# Patient Record
Sex: FEMALE | Race: WHITE | NOT HISPANIC OR LATINO | Employment: OTHER | URBAN - METROPOLITAN AREA
[De-identification: names, ages, dates, MRNs, and addresses within clinical notes are randomized per-mention and may not be internally consistent; named-entity substitution may affect disease eponyms.]

---

## 2023-07-08 LAB
HSV 1 PCR QUAL, SKIN/MUCOSA: DETECTED
HSV 2 PCR QUAL, SKIN/MUCOSA: NOT DETECTED

## 2024-03-18 ENCOUNTER — OFFICE VISIT (OUTPATIENT)
Dept: OBSTETRICS AND GYNECOLOGY | Facility: CLINIC | Age: 44
End: 2024-03-18
Payer: COMMERCIAL

## 2024-03-18 VITALS
HEIGHT: 65 IN | WEIGHT: 116 LBS | BODY MASS INDEX: 19.33 KG/M2 | SYSTOLIC BLOOD PRESSURE: 116 MMHG | DIASTOLIC BLOOD PRESSURE: 74 MMHG

## 2024-03-18 DIAGNOSIS — Z12.4 CERVICAL CANCER SCREENING: ICD-10-CM

## 2024-03-18 DIAGNOSIS — Z01.419 VISIT FOR PELVIC EXAM: Primary | ICD-10-CM

## 2024-03-18 DIAGNOSIS — Z12.31 VISIT FOR SCREENING MAMMOGRAM: ICD-10-CM

## 2024-03-18 PROCEDURE — 1036F TOBACCO NON-USER: CPT | Performed by: OBSTETRICS & GYNECOLOGY

## 2024-03-18 PROCEDURE — 99396 PREV VISIT EST AGE 40-64: CPT | Performed by: OBSTETRICS & GYNECOLOGY

## 2024-03-18 RX ORDER — VALACYCLOVIR HYDROCHLORIDE 1 G/1
1000 TABLET, FILM COATED ORAL EVERY 12 HOURS
COMMUNITY
Start: 2023-07-07

## 2024-03-18 RX ORDER — LEVONORGESTREL 52 MG/1
INTRAUTERINE DEVICE INTRAUTERINE
COMMUNITY

## 2024-03-18 ASSESSMENT — ENCOUNTER SYMPTOMS
CONSTITUTIONAL NEGATIVE: 1
MUSCULOSKELETAL NEGATIVE: 1
GASTROINTESTINAL NEGATIVE: 1
RESPIRATORY NEGATIVE: 1
NEUROLOGICAL NEGATIVE: 1
PSYCHIATRIC NEGATIVE: 1
CARDIOVASCULAR NEGATIVE: 1

## 2024-03-18 NOTE — PATIENT INSTRUCTIONS
Routine Gynecologic Visit:  You were seen today for a routine gyn visit with normal findings on exam  Your pap smear had normal cells and was negative for HPV in 2022, so you were not due for a pap smear today. You should still continue to get annual breast and pelvic exams in the office.  An order was placed in the system for mammogram. Please get done at your earliest convenience  Consider Relizen from [email or web address] for your hot flashes  If you are having any gynecologic issues in the next year you should call the office. [phone] ([place]) or [phone] ([place])

## 2024-03-18 NOTE — PROGRESS NOTES
"Subjective   Idania Del Cid is a 44 y.o. female who is here for a routine exam. Patient is primarily amenorrheic with occasional spotting. Cyclic symptoms include bloating. She notes worsening hot flashes/night sweats. No intermenstrual bleeding, spotting, or discharge. Denies dyspareunia.    Current contraception: IUD  History of abnormal Pap smear: yes - remote  Family history of uterine or ovarian cancer: no  Regular self breast exam: yes  History of abnormal mammogram: no  Family history of breast cancer: no  History of abnormal lipids: no  Menstrual History:  OB History          4    Para   4    Term   4            AB        Living   4         SAB        IAB        Ectopic        Multiple        Live Births   4                No LMP recorded. Patient has had an implant.         Review of Systems   Constitutional: Negative.    Respiratory: Negative.     Cardiovascular: Negative.    Gastrointestinal: Negative.    Genitourinary: Negative.    Musculoskeletal: Negative.    Neurological: Negative.    Psychiatric/Behavioral: Negative.         Objective   /74   Ht 1.651 m (5' 5\")   Wt 52.6 kg (116 lb)   BMI 19.30 kg/m²     General:   alert, appears stated age, and cooperative   Heart: regular rate and rhythm, S1, S2 normal, no murmur, click, rub or gallop   Lungs: clear to auscultation bilaterally   Abdomen: soft, non-tender, without masses or organomegaly   Pelvic: Vulva normal in appearance without discoloration, rashes, lesions. Vagina is negative for blood, discharge, lesions. Uterus is small, mobile, non tender. There is no cervical motion tenderness, adnexal masses/tenderness. IUD strings are visualized.     Breast: No masses, skin changes, discoloration, tenderness, or nipple drainage bilaterally     Neck: Normal ROM. Thyroid normal size. No masses/tenderness     Lymph: No LAD   Psych: Normal mood/affect     Assessment/Plan   Problem List Items Addressed This Visit    None  Visit " Diagnoses       Visit for pelvic exam    -  Primary    Pelvic and breast exam normal  Precautions given  RTO 1 year RA    Cervical cancer screening        NIL/neg 2022    Visit for screening mammogram        Order given 3/18/24    Relevant Orders    BI mammo bilateral screening tomosynthesis          Mia Calderón, DO

## 2024-03-28 ENCOUNTER — APPOINTMENT (OUTPATIENT)
Dept: RADIOLOGY | Facility: HOSPITAL | Age: 44
End: 2024-03-28
Payer: COMMERCIAL

## 2024-03-28 ENCOUNTER — HOSPITAL ENCOUNTER (EMERGENCY)
Facility: HOSPITAL | Age: 44
Discharge: HOME | End: 2024-03-28
Payer: COMMERCIAL

## 2024-03-28 VITALS
DIASTOLIC BLOOD PRESSURE: 94 MMHG | SYSTOLIC BLOOD PRESSURE: 129 MMHG | WEIGHT: 120 LBS | HEART RATE: 74 BPM | HEIGHT: 66 IN | TEMPERATURE: 97.7 F | OXYGEN SATURATION: 100 % | BODY MASS INDEX: 19.29 KG/M2 | RESPIRATION RATE: 18 BRPM

## 2024-03-28 DIAGNOSIS — R10.9 FLANK PAIN: Primary | ICD-10-CM

## 2024-03-28 LAB
ALBUMIN SERPL BCP-MCNC: 4.8 G/DL (ref 3.4–5)
ALP SERPL-CCNC: 53 U/L (ref 33–110)
ALT SERPL W P-5'-P-CCNC: 16 U/L (ref 7–45)
ANION GAP SERPL CALC-SCNC: 12 MMOL/L (ref 10–20)
APPEARANCE UR: CLEAR
AST SERPL W P-5'-P-CCNC: 19 U/L (ref 9–39)
B-HCG SERPL-ACNC: <2 MIU/ML
BASOPHILS # BLD AUTO: 0.03 X10*3/UL (ref 0–0.1)
BASOPHILS NFR BLD AUTO: 0.5 %
BILIRUB SERPL-MCNC: 0.5 MG/DL (ref 0–1.2)
BILIRUB UR STRIP.AUTO-MCNC: NEGATIVE MG/DL
BUN SERPL-MCNC: 11 MG/DL (ref 6–23)
CALCIUM SERPL-MCNC: 9.8 MG/DL (ref 8.6–10.3)
CHLORIDE SERPL-SCNC: 100 MMOL/L (ref 98–107)
CO2 SERPL-SCNC: 29 MMOL/L (ref 21–32)
COLOR UR: COLORLESS
CREAT SERPL-MCNC: 0.79 MG/DL (ref 0.5–1.05)
EGFRCR SERPLBLD CKD-EPI 2021: >90 ML/MIN/1.73M*2
EOSINOPHIL # BLD AUTO: 0.16 X10*3/UL (ref 0–0.7)
EOSINOPHIL NFR BLD AUTO: 2.7 %
ERYTHROCYTE [DISTWIDTH] IN BLOOD BY AUTOMATED COUNT: 12 % (ref 11.5–14.5)
GLUCOSE SERPL-MCNC: 80 MG/DL (ref 74–99)
GLUCOSE UR STRIP.AUTO-MCNC: NEGATIVE MG/DL
HCT VFR BLD AUTO: 45.4 % (ref 36–46)
HGB BLD-MCNC: 15.2 G/DL (ref 12–16)
IMM GRANULOCYTES # BLD AUTO: 0.01 X10*3/UL (ref 0–0.7)
IMM GRANULOCYTES NFR BLD AUTO: 0.2 % (ref 0–0.9)
KETONES UR STRIP.AUTO-MCNC: NEGATIVE MG/DL
LEUKOCYTE ESTERASE UR QL STRIP.AUTO: NEGATIVE
LYMPHOCYTES # BLD AUTO: 1.76 X10*3/UL (ref 1.2–4.8)
LYMPHOCYTES NFR BLD AUTO: 30.2 %
MCH RBC QN AUTO: 30.8 PG (ref 26–34)
MCHC RBC AUTO-ENTMCNC: 33.5 G/DL (ref 32–36)
MCV RBC AUTO: 92 FL (ref 80–100)
MONOCYTES # BLD AUTO: 0.43 X10*3/UL (ref 0.1–1)
MONOCYTES NFR BLD AUTO: 7.4 %
NEUTROPHILS # BLD AUTO: 3.43 X10*3/UL (ref 1.2–7.7)
NEUTROPHILS NFR BLD AUTO: 59 %
NITRITE UR QL STRIP.AUTO: NEGATIVE
NRBC BLD-RTO: 0 /100 WBCS (ref 0–0)
PH UR STRIP.AUTO: 7 [PH]
PLATELET # BLD AUTO: 226 X10*3/UL (ref 150–450)
POTASSIUM SERPL-SCNC: 3.8 MMOL/L (ref 3.5–5.3)
PROT SERPL-MCNC: 8 G/DL (ref 6.4–8.2)
PROT UR STRIP.AUTO-MCNC: NEGATIVE MG/DL
RBC # BLD AUTO: 4.93 X10*6/UL (ref 4–5.2)
RBC # UR STRIP.AUTO: NEGATIVE /UL
SODIUM SERPL-SCNC: 137 MMOL/L (ref 136–145)
SP GR UR STRIP.AUTO: 1
UROBILINOGEN UR STRIP.AUTO-MCNC: <2 MG/DL
WBC # BLD AUTO: 5.8 X10*3/UL (ref 4.4–11.3)

## 2024-03-28 PROCEDURE — 96374 THER/PROPH/DIAG INJ IV PUSH: CPT

## 2024-03-28 PROCEDURE — 74176 CT ABD & PELVIS W/O CONTRAST: CPT

## 2024-03-28 PROCEDURE — 84702 CHORIONIC GONADOTROPIN TEST: CPT | Performed by: PHYSICIAN ASSISTANT

## 2024-03-28 PROCEDURE — 36415 COLL VENOUS BLD VENIPUNCTURE: CPT | Performed by: PHYSICIAN ASSISTANT

## 2024-03-28 PROCEDURE — 2500000004 HC RX 250 GENERAL PHARMACY W/ HCPCS (ALT 636 FOR OP/ED): Performed by: PHYSICIAN ASSISTANT

## 2024-03-28 PROCEDURE — 85025 COMPLETE CBC W/AUTO DIFF WBC: CPT | Performed by: PHYSICIAN ASSISTANT

## 2024-03-28 PROCEDURE — 99284 EMERGENCY DEPT VISIT MOD MDM: CPT | Mod: 25

## 2024-03-28 PROCEDURE — 96375 TX/PRO/DX INJ NEW DRUG ADDON: CPT

## 2024-03-28 PROCEDURE — 81003 URINALYSIS AUTO W/O SCOPE: CPT | Performed by: PHYSICIAN ASSISTANT

## 2024-03-28 PROCEDURE — 80053 COMPREHEN METABOLIC PANEL: CPT | Performed by: PHYSICIAN ASSISTANT

## 2024-03-28 PROCEDURE — 96361 HYDRATE IV INFUSION ADD-ON: CPT

## 2024-03-28 PROCEDURE — 74176 CT ABD & PELVIS W/O CONTRAST: CPT | Performed by: STUDENT IN AN ORGANIZED HEALTH CARE EDUCATION/TRAINING PROGRAM

## 2024-03-28 RX ORDER — ONDANSETRON HYDROCHLORIDE 2 MG/ML
4 INJECTION, SOLUTION INTRAVENOUS ONCE
Status: COMPLETED | OUTPATIENT
Start: 2024-03-28 | End: 2024-03-28

## 2024-03-28 RX ORDER — KETOROLAC TROMETHAMINE 15 MG/ML
15 INJECTION, SOLUTION INTRAMUSCULAR; INTRAVENOUS ONCE
Status: COMPLETED | OUTPATIENT
Start: 2024-03-28 | End: 2024-03-28

## 2024-03-28 RX ADMIN — SODIUM CHLORIDE 1000 ML: 9 INJECTION, SOLUTION INTRAVENOUS at 17:42

## 2024-03-28 RX ADMIN — KETOROLAC TROMETHAMINE 15 MG: 15 INJECTION, SOLUTION INTRAMUSCULAR; INTRAVENOUS at 17:42

## 2024-03-28 RX ADMIN — ONDANSETRON 4 MG: 2 INJECTION INTRAMUSCULAR; INTRAVENOUS at 17:42

## 2024-03-28 ASSESSMENT — PAIN DESCRIPTION - PAIN TYPE: TYPE: ACUTE PAIN

## 2024-03-28 ASSESSMENT — COLUMBIA-SUICIDE SEVERITY RATING SCALE - C-SSRS
2. HAVE YOU ACTUALLY HAD ANY THOUGHTS OF KILLING YOURSELF?: NO
6. HAVE YOU EVER DONE ANYTHING, STARTED TO DO ANYTHING, OR PREPARED TO DO ANYTHING TO END YOUR LIFE?: NO
1. IN THE PAST MONTH, HAVE YOU WISHED YOU WERE DEAD OR WISHED YOU COULD GO TO SLEEP AND NOT WAKE UP?: NO

## 2024-03-28 ASSESSMENT — PAIN DESCRIPTION - LOCATION: LOCATION: ABDOMEN

## 2024-03-28 ASSESSMENT — PAIN - FUNCTIONAL ASSESSMENT
PAIN_FUNCTIONAL_ASSESSMENT: 0-10
PAIN_FUNCTIONAL_ASSESSMENT: 0-10

## 2024-03-28 ASSESSMENT — LIFESTYLE VARIABLES
HAVE PEOPLE ANNOYED YOU BY CRITICIZING YOUR DRINKING: NO
TOTAL SCORE: 0
EVER HAD A DRINK FIRST THING IN THE MORNING TO STEADY YOUR NERVES TO GET RID OF A HANGOVER: NO
EVER FELT BAD OR GUILTY ABOUT YOUR DRINKING: NO
HAVE YOU EVER FELT YOU SHOULD CUT DOWN ON YOUR DRINKING: NO

## 2024-03-28 ASSESSMENT — PAIN SCALES - GENERAL
PAINLEVEL_OUTOF10: 8
PAINLEVEL_OUTOF10: 7

## 2024-03-28 ASSESSMENT — PAIN DESCRIPTION - ORIENTATION: ORIENTATION: RIGHT

## 2024-03-28 ASSESSMENT — PAIN DESCRIPTION - DESCRIPTORS: DESCRIPTORS: ACHING;SHARP;STABBING

## 2024-03-28 NOTE — ED TRIAGE NOTES
Patient c/o right flank and back pain that started a week ago, patient has a hx of kidney stones and had a recent CT that showed 5 kidney stones in the right ureter. Patient was unable to get into her urologist today.

## 2024-03-28 NOTE — ED PROVIDER NOTES
HPI   Chief Complaint   Patient presents with    Flank Pain       This is a 44-year-old female with PMH nephrolithiasis with associated lithotripsy and stents distantly presenting for evaluation of atraumatic left flank pain rating to the groin since Monday.  Initially she thought it was because her kids slept in the bed with her but now she is thinking it is a stone.  Her urologist office advised her to come in for evaluation. Denies fevers, chills, chest pain, shortness of breath, nausea, vomiting, diarrhea, constipation, hematochezia, melena, vaginal symptoms, scrotal pain.      History provided by:  Patient   used: No                        Ton Coma Scale Score: 15                     Patient History   Past Medical History:   Diagnosis Date    Acute vaginitis 10/10/2014    Bacterial vaginosis    Breast implant status     History of breast augmentation    Encounter for full-term uncomplicated delivery     Vaginal delivery    Encounter for pregnancy test, result unknown 09/09/2016    Unconfirmed pregnancy    Encounter for pregnancy test, result unknown 09/09/2016    Visit for confirmation of pregnancy test result with physical exam    Encounter for supervision of normal pregnancy, unspecified, first trimester 09/09/2016    Encounter for pregnancy related examination in first trimester    Irregular menstruation, unspecified 09/09/2016    Missed period    Papillomavirus as the cause of diseases classified elsewhere 09/30/2014    HPV in female    Personal history of diseases of the skin and subcutaneous tissue     History of alopecia    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  Social History     Tobacco Use    Smoking status: Never    Smokeless tobacco: Never   Vaping Use    Vaping Use: Never used   Substance Use Topics    Alcohol use: Yes    Drug use: Never       Physical Exam   ED Triage Vitals [03/28/24 1709]   Temperature Heart Rate Respirations BP   36.5 °C (97.7 °F) 75 18 141/89      Pulse Ox Temp Source Heart Rate Source Patient Position   98 % Temporal Monitor --      BP Location FiO2 (%)     -- --       Physical Exam    General: Vitals noted. Afebrile. No apparent distress  EENT: Sclerae anicteric  Cardiac: Regular rate and rhythm. No murmur  Pulmonary: Lungs clear bilaterally with good aeration  Abdomen: Soft. Nontender. No rebound. No guarding  : Left CVA tenderness. exam deferred  Extremities: CHONG normally  Skin: No rash on abdomen  Neuro: Alert and oriented    ED Course & MDM   Diagnoses as of 03/28/24 1911   Flank pain       Medical Decision Making  DDx: Pyelonephritis, ureterolithiasis, hydronephrosis, UTI    Patient is stable hemodynamically.  Appears somewhat uncomfortable.  Physical exam as above.  Laboratory evaluation reassuring.  No leukocytosis, stable H&H.  CT abdomen pelvis without contrast showed obstructing subcentimeter bilateral renal calculi no evidence of ureteral calculus but mild bilateral Pallavi atelectasis possibly sequela of remote obstructive uropathy with incomplete resolution of the hydronephrosis no evidence of discernible inflammatory changes about the kidneys as read by the radiologist.  Patient was given IV normal saline IV Toradol IV Zofran.  At this time patient is stable to be discharged to follow-up.  Instructed to return to the nearest ED if any concerns or new or worsening symptoms. Patient verbalized understanding and agreement with plan. Discharged in stable condition.      Disclaimer: This note was dictated using speech recognition software. An attempt at proofreading was made to  minimize errors. Minor errors in transcription may be present. Please call if questions.    Amount and/or Complexity of Data Reviewed  Labs: ordered.  Radiology: ordered.        Procedure  Procedures     Jose Carlisle PA-C  03/28/24 1913       Jose Carlisle PA-C  03/28/24 1914

## 2024-03-29 LAB — HOLD SPECIMEN: NORMAL

## 2024-04-26 ENCOUNTER — OFFICE VISIT (OUTPATIENT)
Dept: UROLOGY | Facility: HOSPITAL | Age: 44
End: 2024-04-26
Payer: COMMERCIAL

## 2024-04-26 DIAGNOSIS — N39.3 SUI (STRESS URINARY INCONTINENCE, FEMALE): ICD-10-CM

## 2024-04-26 DIAGNOSIS — R10.9 BILATERAL FLANK PAIN: Primary | ICD-10-CM

## 2024-04-26 DIAGNOSIS — N20.0 NEPHROLITHIASIS: ICD-10-CM

## 2024-04-26 PROCEDURE — 99214 OFFICE O/P EST MOD 30 MIN: CPT | Performed by: UROLOGY

## 2024-04-26 PROCEDURE — 1036F TOBACCO NON-USER: CPT | Performed by: UROLOGY

## 2024-04-26 PROCEDURE — 99204 OFFICE O/P NEW MOD 45 MIN: CPT | Performed by: UROLOGY

## 2024-04-26 NOTE — PROGRESS NOTES
"HPI    History of stones  History of flank pain    Seen in ED 3/28/24 with L flank pain. CT with just nonobstructing stones bilaterally, 3 stones up to 6mm on L, 1 stone approx 3mm on R.     04/26/24 -seen today in consult.  History of stones, last stone episode and surgery was about 10 years ago.  Her pain is now gone.  We compared her prior CT scan from 2023 with her current scan, essentially looks exactly the same.  There is no suggestion of significant metabolic activity.    LUTS  Stress urinary incontinence, only mildly bothersome.    Stones   Prior stones, prior interventions: Ureteroscopy in 2009, approximately 2015      No results found for: \"PSA\"      Current Medications:  Current Outpatient Medications   Medication Sig Dispense Refill    levonorgestrel (Mirena) 21 mcg/24 hours (8 yrs) 52 mg IUD by intrauterine route.      valACYclovir (Valtrex) 1 gram tablet Take 1 tablet (1,000 mg) by mouth every 12 hours.       No current facility-administered medications for this visit.        Active Problems:  Idania Del Cid is a 44 y.o. female with the following Problems and Medications.  There is no problem list on file for this patient.    Current Outpatient Medications   Medication Sig Dispense Refill    levonorgestrel (Mirena) 21 mcg/24 hours (8 yrs) 52 mg IUD by intrauterine route.      valACYclovir (Valtrex) 1 gram tablet Take 1 tablet (1,000 mg) by mouth every 12 hours.       No current facility-administered medications for this visit.       PMH:  Past Medical History:   Diagnosis Date    Acute vaginitis 10/10/2014    Bacterial vaginosis    Breast implant status     History of breast augmentation    Encounter for full-term uncomplicated delivery ([facility])     Vaginal delivery    Encounter for pregnancy test, result unknown 09/09/2016    Unconfirmed pregnancy    Encounter for pregnancy test, result unknown 09/09/2016    Visit for confirmation of pregnancy test result with physical exam    Encounter for " supervision of normal pregnancy, unspecified, first trimester ([facility]) 09/09/2016    Encounter for pregnancy related examination in first trimester    Irregular menstruation, unspecified 09/09/2016    Missed period    Papillomavirus as the cause of diseases classified elsewhere 09/30/2014    HPV in female    Personal history of diseases of the skin and subcutaneous tissue     History of alopecia    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    SHx:  Social History     Tobacco Use    Smoking status: Never    Smokeless tobacco: Never   Vaping Use    Vaping status: Never Used   Substance Use Topics    Alcohol use: Yes    Drug use: Never       Allergies:  No Known Allergies    Physical Exam:  No CVA tenderness bilaterally    Assessment/Plan  44-year-old female with history of stones, recent episode of flank pain.  CT at that time showed nonobstructive stones.  We compared her CT from last month with CT from 1 year prior, essentially the same suggesting no metabolic activity.  Not recommend metabolic management at this time given lack of evidence of ongoing metabolic activity.  Discussed the surgical management of her stones.  Discussed ESWL versus ureteroscopy.  Discussed indications for stone removal.    For now she would like to think about it.  If she would like to proceed with surgery, she will let me know.  If she would like to ESWL, would obtain a KUB to see if the stones are radiopaque and then determine a plan of care from there.  If she would like ureteroscopy, would not need any further imaging.    If she is doing well and  elects no interval intervention, we will see her back in 1 year with a KUB and ultrasound prior to keep an eye on her stones.  If at some point they are growing, would revisit metabolic testing.    We discussed her stress incontinence.  Discussed observation versus pelvic floor physical therapy versus urethral bulking agents versus slings.  She will think about it and let me know if she would like to discuss further.            Scribe Attestation  By signing my name below, I, Serenity Melendez, attest that this documentation  has been prepared under the direction and in the presence of Clinton Perry MD.

## 2024-05-09 ENCOUNTER — APPOINTMENT (OUTPATIENT)
Dept: UROLOGY | Facility: HOSPITAL | Age: 44
End: 2024-05-09
Payer: COMMERCIAL

## 2024-10-17 ASSESSMENT — ENCOUNTER SYMPTOMS
SORE THROAT: 0
ANOREXIA: 0
HEMATURIA: 0
CHILLS: 0
FLANK PAIN: 0
ABDOMINAL PAIN: 0
FREQUENCY: 1
HEADACHES: 0
BACK PAIN: 0
DYSURIA: 0
CONSTIPATION: 1
NAUSEA: 0
FEVER: 0
DIARRHEA: 0
VOMITING: 0

## 2024-10-18 ENCOUNTER — OFFICE VISIT (OUTPATIENT)
Dept: OBSTETRICS AND GYNECOLOGY | Facility: CLINIC | Age: 44
End: 2024-10-18
Payer: COMMERCIAL

## 2024-10-18 VITALS
WEIGHT: 123 LBS | SYSTOLIC BLOOD PRESSURE: 108 MMHG | DIASTOLIC BLOOD PRESSURE: 72 MMHG | HEIGHT: 66 IN | BODY MASS INDEX: 19.77 KG/M2

## 2024-10-18 DIAGNOSIS — N89.8 VAGINAL ODOR: Primary | ICD-10-CM

## 2024-10-18 LAB
POC APPEARANCE, URINE: CLEAR
POC BILIRUBIN, URINE: NEGATIVE
POC BLOOD, URINE: ABNORMAL
POC COLOR, URINE: ABNORMAL
POC GLUCOSE, URINE: NEGATIVE MG/DL
POC KETONES, URINE: NEGATIVE MG/DL
POC LEUKOCYTES, URINE: NEGATIVE
POC NITRITE,URINE: NEGATIVE
POC PH, URINE: 7 PH
POC PROTEIN, URINE: NEGATIVE MG/DL
POC SPECIFIC GRAVITY, URINE: 1.01
POC UROBILINOGEN, URINE: 0.2 EU/DL

## 2024-10-18 PROCEDURE — 81003 URINALYSIS AUTO W/O SCOPE: CPT | Performed by: NURSE PRACTITIONER

## 2024-10-18 PROCEDURE — 1036F TOBACCO NON-USER: CPT | Performed by: NURSE PRACTITIONER

## 2024-10-18 PROCEDURE — 3008F BODY MASS INDEX DOCD: CPT | Performed by: NURSE PRACTITIONER

## 2024-10-18 PROCEDURE — 99213 OFFICE O/P EST LOW 20 MIN: CPT | Performed by: NURSE PRACTITIONER

## 2024-10-18 PROCEDURE — 87205 SMEAR GRAM STAIN: CPT

## 2024-10-18 RX ORDER — METRONIDAZOLE 500 MG/1
500 TABLET ORAL 2 TIMES DAILY
Qty: 14 TABLET | Refills: 0 | Status: SHIPPED | OUTPATIENT
Start: 2024-10-18 | End: 2024-10-25

## 2024-10-18 ASSESSMENT — ENCOUNTER SYMPTOMS
SORE THROAT: 0
FREQUENCY: 1
CONSTIPATION: 1
DIARRHEA: 0
VOMITING: 0
DYSURIA: 0
NAUSEA: 0
BACK PAIN: 0
FEVER: 0
HEMATURIA: 0
HEADACHES: 0
ABDOMINAL PAIN: 0
CHILLS: 0
FLANK PAIN: 0

## 2024-10-18 NOTE — PROGRESS NOTES
"Chief Complaint    VAGINAL ODOR        HPI    PATIENT DECLINES CHAPERONE    PATIENT IS HERE BECAUSE OF SHE HAD A PERIOD AND STATES THAT SHE NEVER GETS A PERIOD SINCE THE MIRENA WAS PUT IN 7 YEARS AGO, PATIENT STATES THE BLOOD WAS BRIGHT RED THAT TURNED TO PINK AND IT HAD A SMELL TO IT. PERIOD LASTED FOR A WEEK. SINCE THE PERIOD SHE STILL HAD THE ODOR.   Last edited by Genny Corona MA on 10/18/2024 10:47 AM.         44 y.o.  female who presents with complaints of abnormal vaginal symptoms.   Has the Mirena IUD which was inserted 7 years ago.   Typically amenorrheic with the IUD.   Recently had a period a couple of weeks ago. Lasted 7 days.    Noticed abnormal odor which persisted after period ended. No abnormal vaginal discharge, or itching.   H/O BV in the past.   Denies fever, chills, urinary symptoms, pelvic pain.   She is sexually active with  without concern.   PMH: HSV, ELENA-3 in     /72   Ht 1.676 m (5' 6\")   Wt 55.8 kg (123 lb)   BMI 19.85 kg/m²      Current Outpatient Medications   Medication Instructions    levonorgestrel (Mirena) 21 mcg/24 hours (8 yrs) 52 mg IUD by intrauterine route.    valACYclovir (VALTREX) 1,000 mg, Every 12 hours        Review of Systems   Constitutional:  Negative for chills and fever.   HENT:  Negative for sore throat.    Gastrointestinal:  Positive for constipation. Negative for abdominal pain, diarrhea, nausea and vomiting.   Genitourinary:  Positive for frequency and vaginal discharge. Negative for dysuria, flank pain, hematuria, pelvic pain and urgency.   Musculoskeletal:  Negative for back pain.   Skin:  Negative for rash.   Neurological:  Negative for headaches.   All other systems reviewed and are negative.       Physical Exam  Constitutional:       Appearance: Normal appearance.   HENT:      Head: Normocephalic.      Nose: Nose normal.   Pulmonary:      Effort: Pulmonary effort is normal.   Genitourinary:     General: Normal vulva.      Vagina: " Vaginal discharge (Thin, white) present.      Cervix: Normal.      Comments: IUD strings present  Musculoskeletal:         General: Normal range of motion.      Cervical back: Normal range of motion and neck supple.   Neurological:      Mental Status: She is alert.   Psychiatric:         Mood and Affect: Mood normal.         Behavior: Behavior normal.          Assessment/Plan:  1. Vaginal odor (Primary)  -Collected: Vaginitis Gram Stain For Bacterial Vaginosis + Yeast  -Will notify of results.   -Rx sent: metroNIDAZOLE (Flagyl) 500 mg tablet; Take 1 tablet (500 mg) by mouth 2 times a day for 7 days.  Dispense: 14 tablet; Refill: 0  -Follow up as needed

## 2024-10-21 LAB
CLUE CELLS VAG LPF-#/AREA: PRESENT /[LPF]
NUGENT SCORE: 10
YEAST VAG WET PREP-#/AREA: ABNORMAL

## 2025-02-10 ENCOUNTER — APPOINTMENT (OUTPATIENT)
Dept: OBSTETRICS AND GYNECOLOGY | Facility: CLINIC | Age: 45
End: 2025-02-10
Payer: COMMERCIAL

## 2025-02-11 ENCOUNTER — HOSPITAL ENCOUNTER (EMERGENCY)
Facility: HOSPITAL | Age: 45
Discharge: HOME | End: 2025-02-11
Attending: STUDENT IN AN ORGANIZED HEALTH CARE EDUCATION/TRAINING PROGRAM
Payer: COMMERCIAL

## 2025-02-11 ENCOUNTER — APPOINTMENT (OUTPATIENT)
Dept: RADIOLOGY | Facility: HOSPITAL | Age: 45
End: 2025-02-11
Payer: COMMERCIAL

## 2025-02-11 VITALS
SYSTOLIC BLOOD PRESSURE: 129 MMHG | TEMPERATURE: 97.5 F | RESPIRATION RATE: 16 BRPM | BODY MASS INDEX: 20.57 KG/M2 | HEIGHT: 66 IN | DIASTOLIC BLOOD PRESSURE: 83 MMHG | HEART RATE: 78 BPM | OXYGEN SATURATION: 100 % | WEIGHT: 128 LBS

## 2025-02-11 DIAGNOSIS — R55 SYNCOPE AND COLLAPSE: Primary | ICD-10-CM

## 2025-02-11 LAB
ALBUMIN SERPL BCP-MCNC: 4.2 G/DL (ref 3.4–5)
ALP SERPL-CCNC: 54 U/L (ref 33–110)
ALT SERPL W P-5'-P-CCNC: 19 U/L (ref 7–45)
ANION GAP SERPL CALC-SCNC: 12 MMOL/L (ref 10–20)
AST SERPL W P-5'-P-CCNC: 17 U/L (ref 9–39)
B-HCG SERPL-ACNC: <2 MIU/ML
BASOPHILS # BLD AUTO: 0.03 X10*3/UL (ref 0–0.1)
BASOPHILS NFR BLD AUTO: 0.4 %
BILIRUB SERPL-MCNC: 0.3 MG/DL (ref 0–1.2)
BUN SERPL-MCNC: 14 MG/DL (ref 6–23)
CALCIUM SERPL-MCNC: 7.8 MG/DL (ref 8.6–10.3)
CARDIAC TROPONIN I PNL SERPL HS: <3 NG/L (ref 0–13)
CHLORIDE SERPL-SCNC: 103 MMOL/L (ref 98–107)
CO2 SERPL-SCNC: 26 MMOL/L (ref 21–32)
CREAT SERPL-MCNC: 0.79 MG/DL (ref 0.5–1.05)
EGFRCR SERPLBLD CKD-EPI 2021: >90 ML/MIN/1.73M*2
EOSINOPHIL # BLD AUTO: 0.09 X10*3/UL (ref 0–0.7)
EOSINOPHIL NFR BLD AUTO: 1.1 %
ERYTHROCYTE [DISTWIDTH] IN BLOOD BY AUTOMATED COUNT: 11.8 % (ref 11.5–14.5)
FLUAV RNA RESP QL NAA+PROBE: NOT DETECTED
FLUBV RNA RESP QL NAA+PROBE: NOT DETECTED
GLUCOSE SERPL-MCNC: 92 MG/DL (ref 74–99)
HCT VFR BLD AUTO: 43.1 % (ref 36–46)
HGB BLD-MCNC: 14.7 G/DL (ref 12–16)
IMM GRANULOCYTES # BLD AUTO: 0.06 X10*3/UL (ref 0–0.7)
IMM GRANULOCYTES NFR BLD AUTO: 0.7 % (ref 0–0.9)
LYMPHOCYTES # BLD AUTO: 0.92 X10*3/UL (ref 1.2–4.8)
LYMPHOCYTES NFR BLD AUTO: 11.1 %
MAGNESIUM SERPL-MCNC: 1.81 MG/DL (ref 1.6–2.4)
MCH RBC QN AUTO: 30.8 PG (ref 26–34)
MCHC RBC AUTO-ENTMCNC: 34.1 G/DL (ref 32–36)
MCV RBC AUTO: 90 FL (ref 80–100)
MONOCYTES # BLD AUTO: 0.53 X10*3/UL (ref 0.1–1)
MONOCYTES NFR BLD AUTO: 6.4 %
NEUTROPHILS # BLD AUTO: 6.64 X10*3/UL (ref 1.2–7.7)
NEUTROPHILS NFR BLD AUTO: 80.3 %
NRBC BLD-RTO: 0 /100 WBCS (ref 0–0)
PLATELET # BLD AUTO: 216 X10*3/UL (ref 150–450)
POTASSIUM SERPL-SCNC: 3.4 MMOL/L (ref 3.5–5.3)
PROT SERPL-MCNC: 6.8 G/DL (ref 6.4–8.2)
RBC # BLD AUTO: 4.77 X10*6/UL (ref 4–5.2)
RSV RNA RESP QL NAA+PROBE: NOT DETECTED
SARS-COV-2 RNA RESP QL NAA+PROBE: NOT DETECTED
SODIUM SERPL-SCNC: 138 MMOL/L (ref 136–145)
WBC # BLD AUTO: 8.3 X10*3/UL (ref 4.4–11.3)

## 2025-02-11 PROCEDURE — 36415 COLL VENOUS BLD VENIPUNCTURE: CPT

## 2025-02-11 PROCEDURE — 80053 COMPREHEN METABOLIC PANEL: CPT

## 2025-02-11 PROCEDURE — 84484 ASSAY OF TROPONIN QUANT: CPT

## 2025-02-11 PROCEDURE — 2500000001 HC RX 250 WO HCPCS SELF ADMINISTERED DRUGS (ALT 637 FOR MEDICARE OP)

## 2025-02-11 PROCEDURE — 71046 X-RAY EXAM CHEST 2 VIEWS: CPT

## 2025-02-11 PROCEDURE — 84702 CHORIONIC GONADOTROPIN TEST: CPT

## 2025-02-11 PROCEDURE — 70450 CT HEAD/BRAIN W/O DYE: CPT | Performed by: RADIOLOGY

## 2025-02-11 PROCEDURE — 85025 COMPLETE CBC W/AUTO DIFF WBC: CPT

## 2025-02-11 PROCEDURE — 99285 EMERGENCY DEPT VISIT HI MDM: CPT | Mod: 25 | Performed by: STUDENT IN AN ORGANIZED HEALTH CARE EDUCATION/TRAINING PROGRAM

## 2025-02-11 PROCEDURE — 87637 SARSCOV2&INF A&B&RSV AMP PRB: CPT

## 2025-02-11 PROCEDURE — 83735 ASSAY OF MAGNESIUM: CPT

## 2025-02-11 PROCEDURE — 70450 CT HEAD/BRAIN W/O DYE: CPT

## 2025-02-11 RX ORDER — ACETAMINOPHEN 325 MG/1
975 TABLET ORAL ONCE
Status: COMPLETED | OUTPATIENT
Start: 2025-02-11 | End: 2025-02-11

## 2025-02-11 RX ADMIN — ACETAMINOPHEN 975 MG: 325 TABLET, FILM COATED ORAL at 16:43

## 2025-02-11 ASSESSMENT — PAIN SCALES - GENERAL: PAINLEVEL_OUTOF10: 8

## 2025-02-11 ASSESSMENT — LIFESTYLE VARIABLES
TOTAL SCORE: 0
HAVE PEOPLE ANNOYED YOU BY CRITICIZING YOUR DRINKING: NO
HAVE YOU EVER FELT YOU SHOULD CUT DOWN ON YOUR DRINKING: NO
EVER HAD A DRINK FIRST THING IN THE MORNING TO STEADY YOUR NERVES TO GET RID OF A HANGOVER: NO
EVER FELT BAD OR GUILTY ABOUT YOUR DRINKING: NO

## 2025-02-11 ASSESSMENT — PAIN - FUNCTIONAL ASSESSMENT: PAIN_FUNCTIONAL_ASSESSMENT: 0-10

## 2025-02-11 ASSESSMENT — COLUMBIA-SUICIDE SEVERITY RATING SCALE - C-SSRS
2. HAVE YOU ACTUALLY HAD ANY THOUGHTS OF KILLING YOURSELF?: NO
1. IN THE PAST MONTH, HAVE YOU WISHED YOU WERE DEAD OR WISHED YOU COULD GO TO SLEEP AND NOT WAKE UP?: NO
6. HAVE YOU EVER DONE ANYTHING, STARTED TO DO ANYTHING, OR PREPARED TO DO ANYTHING TO END YOUR LIFE?: NO

## 2025-02-11 ASSESSMENT — PAIN DESCRIPTION - LOCATION: LOCATION: HEAD

## 2025-02-11 NOTE — ED TRIAGE NOTES
Pt fainted after seeing her son have his blood drawn. Had felt dizzy and sat down and then when she stood up she fainted and fell backwards hitting her head on the wall. Pt has large hematoma to the left back of head. Per EMS, pt was orthostatic. IV placed, fluids given by EMS. Pt states she has had flu like symptoms the last few days.

## 2025-02-11 NOTE — DISCHARGE INSTRUCTIONS
You were seen today after you syncopized and hit your head.  You do not appear to have any intracranial injury, your labs are within normal limits. This could be in the setting of being dehydrated as you did have a change in her vital signs when you stood up.  You did get some fluids here, and this did not happen anymore.  Given this, we will discharge you home at this time.  I would recommend following up with your primary care doctor, as they may want to do some additional testing.  Please return here if this continues to happen, you develop any numbness weakness or tingling on one side your body, difficulty swallowing or speaking, or anything else even concerning.

## 2025-02-11 NOTE — ED PROVIDER NOTES
History of Present Illness     History provided by: Patient and EMS  Limitations to History: None  External Records Reviewed with Brief Summary: None    HPI:  Idania Del Cid is a 44 y.o. female with no past medical history presents today for syncope.  Patient states that she was watching her son get blood drawn, when they left the office she began to feel flushed hot and nauseous, so she sat down.  She began to feel better, so she stood up.  When she stood up she then fell hitting her head on the wall.  She does endorse that she lost consciousness, does endorse some headache.  She denies any numbness weakness or tingling in arms or legs.  Denies any previous history of this.  She denies any difficulty swallowing or speaking, changes in vision or hearing.  She denies any abdominal pain nausea vomiting or diarrhea.  She denies any chest pain or shortness of breath at this time.  Denies previous history of blood thinners, neurosurgery.    Physical Exam   Triage vitals:  T 36.4 °C (97.5 °F)  HR 68  /82  RR 16  O2 100 % None (Room air)    Physical Exam  Vitals and nursing note reviewed.   Constitutional:       General: She is not in acute distress.     Appearance: Normal appearance. She is not ill-appearing or diaphoretic.   HENT:      Head: Normocephalic.      Comments: Palpable right parietal scalp hematoma tenderness to palpation, no palpable step-offs or deformities, no active bleeding, no laceration or abrasion visualized.     Mouth/Throat:      Mouth: Mucous membranes are moist.      Pharynx: No oropharyngeal exudate or posterior oropharyngeal erythema.   Eyes:      General: No scleral icterus.     Extraocular Movements: Extraocular movements intact.      Pupils: Pupils are equal, round, and reactive to light.   Cardiovascular:      Rate and Rhythm: Normal rate and regular rhythm.      Pulses: Normal pulses.      Heart sounds: Normal heart sounds. No murmur heard.     No gallop.   Pulmonary:       Effort: Pulmonary effort is normal. No respiratory distress.      Breath sounds: Normal breath sounds. No stridor. No wheezing, rhonchi or rales.   Abdominal:      General: Bowel sounds are normal. There is no distension.      Palpations: Abdomen is soft. There is no mass.      Tenderness: There is no abdominal tenderness.      Hernia: No hernia is present.   Musculoskeletal:         General: No swelling, deformity or signs of injury. Normal range of motion.      Cervical back: Normal range of motion and neck supple. No tenderness.      Comments: 5 out of 5 strength in handgrip elbow flexion extension dorsiflexion plantarflexion hip flexion bilaterally   Skin:     General: Skin is warm.      Capillary Refill: Capillary refill takes less than 2 seconds.      Findings: No erythema, lesion or rash.   Neurological:      General: No focal deficit present.      Mental Status: She is alert and oriented to person, place, and time. Mental status is at baseline.      Cranial Nerves: No cranial nerve deficit.      Motor: No weakness.      Comments: NIH stroke scale of 0, hints exam negative   Psychiatric:         Mood and Affect: Mood normal.         Behavior: Behavior normal.          Medical Decision Making & ED Course   Medical Decision Makin y.o. female 44-year-old female no stated past medical history presents today for syncopal episode.  Given the patient did fall and does have a rather impressive hematoma, we will get CT imaging to confirm the patient has no evidence of intracranial injury.  Addition, we will get basic labs to confirm there is no reason that this happen other than vasovagal syncope.  Given the fact the patient did have presyncopal symptoms, my concern for cardiogenic syncope is relatively low.  EKG here was normal, troponins were normal.  She did have a mildly low potassium, however, has not eaten since this morning.  Patient CT head does demonstrate a scalp hematoma, but no evidence of  intracranial injury.  Patient's labs are otherwise within normal limits.  Given this, I believe the patient is appropriate for discharge with follow-up as needed.  ----      Differential diagnoses considered include but are not limited to: Vasovagal syncope, subdural subarachnoid CVA, cardiogenic syncope     Social Determinants of Health which Significantly Impact Care: None identified     EKG Independent Interpretation:  EKG shows a normal rate and rhythm, normal axis, normal intervals. And normal ST and T wave pattern with no evidence of acute ischemia or other acute findings    Independent Result Review and Interpretation: Relevant laboratory and radiographic results were reviewed and independently interpreted by myself.  As necessary, they are commented on in the ED Course.    Chronic conditions affecting the patient's care: As documented above in [facility]    The patient was discussed with the following consultants/services: None    Care Considerations: As documented above in [facility]    ED Course:  Diagnoses as of 02/13/25 1218   Syncope and collapse     Disposition   As a result of the work-up, the patient was discharged home.  she was informed of her diagnosis and instructed to come back with any concerns or worsening of condition.  she and was agreeable to the plan as discussed above.  she was given the opportunity to ask questions.  All of the patient's questions were answered.    Procedures   Procedures    Patient seen and discussed with ED attending physician.    Jw Del Angel MD  Emergency Medicine       Jw Del Angel MD  Resident  02/13/25 1223

## 2025-03-24 ENCOUNTER — APPOINTMENT (OUTPATIENT)
Dept: OBSTETRICS AND GYNECOLOGY | Facility: CLINIC | Age: 45
End: 2025-03-24
Payer: COMMERCIAL

## 2025-03-24 VITALS
DIASTOLIC BLOOD PRESSURE: 80 MMHG | WEIGHT: 128.8 LBS | HEIGHT: 66 IN | BODY MASS INDEX: 20.7 KG/M2 | SYSTOLIC BLOOD PRESSURE: 120 MMHG

## 2025-03-24 DIAGNOSIS — Z12.4 CERVICAL CANCER SCREENING: ICD-10-CM

## 2025-03-24 DIAGNOSIS — Z01.419 WELL WOMAN EXAM: Primary | ICD-10-CM

## 2025-03-24 DIAGNOSIS — Z12.31 VISIT FOR SCREENING MAMMOGRAM: ICD-10-CM

## 2025-03-24 DIAGNOSIS — Z30.431 IUD CHECK UP: ICD-10-CM

## 2025-03-24 PROBLEM — Z86.001 HISTORY OF CARCINOMA IN SITU OF CERVIX: Status: RESOLVED | Noted: 2025-03-24 | Resolved: 2025-03-24

## 2025-03-24 PROCEDURE — 1036F TOBACCO NON-USER: CPT | Performed by: OBSTETRICS & GYNECOLOGY

## 2025-03-24 PROCEDURE — 99396 PREV VISIT EST AGE 40-64: CPT | Performed by: OBSTETRICS & GYNECOLOGY

## 2025-03-24 PROCEDURE — 3008F BODY MASS INDEX DOCD: CPT | Performed by: OBSTETRICS & GYNECOLOGY

## 2025-03-24 ASSESSMENT — ENCOUNTER SYMPTOMS
CONSTITUTIONAL NEGATIVE: 1
CARDIOVASCULAR NEGATIVE: 1
NEUROLOGICAL NEGATIVE: 1
CARDIOVASCULAR NEGATIVE: 0
ALLERGIC/IMMUNOLOGIC NEGATIVE: 0
MUSCULOSKELETAL NEGATIVE: 0
ENDOCRINE NEGATIVE: 0
EYES NEGATIVE: 0
CONSTITUTIONAL NEGATIVE: 0
RESPIRATORY NEGATIVE: 0
PSYCHIATRIC NEGATIVE: 0
NEUROLOGICAL NEGATIVE: 0
HEMATOLOGIC/LYMPHATIC NEGATIVE: 0
RESPIRATORY NEGATIVE: 1
GASTROINTESTINAL NEGATIVE: 1
GASTROINTESTINAL NEGATIVE: 0
MUSCULOSKELETAL NEGATIVE: 1
PSYCHIATRIC NEGATIVE: 1

## 2025-03-24 NOTE — PROGRESS NOTES
"Subjective   Idania Del Cid is a 45 y.o. female who is here for a routine exam. Patient with only occasional spotting with IUD. Dysmenorrhea:none. Cyclic symptoms include none. No intermenstrual bleeding, spotting, or discharge. Denies dyspareunia.    Current contraception: IUD  History of abnormal Pap smear: yes - remote post LEEP  Family history of uterine or ovarian cancer: no  Regular self breast exam: yes  History of abnormal mammogram: no  Family history of breast cancer: no  History of abnormal lipids: no  Menstrual History:  OB History          4    Para   4    Term   4       0    AB   0    Living   4         SAB        IAB        Ectopic        Multiple        Live Births   4                  No LMP recorded. Patient has had an implant.         Review of Systems   Constitutional: Negative.    Respiratory: Negative.     Cardiovascular: Negative.    Gastrointestinal: Negative.    Genitourinary: Negative.    Musculoskeletal: Negative.    Skin: Negative.    Neurological: Negative.    Psychiatric/Behavioral: Negative.         Objective   /80 (BP Location: Right arm, Patient Position: Sitting)   Ht 1.676 m (5' 6\")   Wt 58.4 kg (128 lb 12.8 oz)   BMI 20.79 kg/m²     General:   alert, appears stated age, and cooperative   Heart: regular rate and rhythm, S1, S2 normal, no murmur, click, rub or gallop   Lungs: clear to auscultation bilaterally   Abdomen: soft, non-tender, without masses or organomegaly   Pelvic: Vulva normal in appearance without discoloration, rashes, lesions. Vagina is negative for blood, discharge, lesions. Uterus is small, mobile, non tender. There is no cervical motion tenderness, adnexal masses/tenderness. IUD strings are visualized.     Breast: No masses, skin changes, discoloration, tenderness, or nipple drainage bilaterally     Neck: Normal ROM. Thyroid normal size. No masses/tenderness     Lymph: No LAD   Psych: Normal mood/affect     Assessment/Plan   Problem " List Items Addressed This Visit    None  Visit Diagnoses       Well woman exam    -  Primary    Cervical cancer screening        IUD check up        Visit for screening mammogram        Relevant Orders    BI mammo bilateral screening tomosynthesis          1. Pelvic/breast exam wnl, counseled on breast awareness/self exam  2. Pap NIL/neg 2022.  3. Plan for IUD removal and reinsertion  4. Order given for mammogram    RTO 1 year  Mia Calderón, DO

## 2025-03-24 NOTE — PATIENT INSTRUCTIONS
Routine Gynecologic Visit:  You were seen today for a routine gyn visit with normal findings on exam  Your pap smear had normal cells in 2022, so you were not due for a pap smear today. You should still continue to get annual breast and pelvic exams in the office.  Continue self breast exams/monitoring at home and call for appointment in the office if there are ever masses, skin discoloration, dimpling/puckering of the skin, or nipple drainage when you are not pregnant  We discussed contraception today your IUD is in the correct position without complication. The staff will contact you in the  next 24 hours to confirm removal and reinsertion appointment  If you are having any gynecologic issues in the next year you should call the office. [phone] ([place]) or [phone] ([place])

## 2025-03-31 ENCOUNTER — APPOINTMENT (OUTPATIENT)
Dept: OBSTETRICS AND GYNECOLOGY | Facility: CLINIC | Age: 45
End: 2025-03-31
Payer: COMMERCIAL

## 2025-03-31 VITALS
HEIGHT: 66 IN | DIASTOLIC BLOOD PRESSURE: 70 MMHG | SYSTOLIC BLOOD PRESSURE: 112 MMHG | BODY MASS INDEX: 20.51 KG/M2 | WEIGHT: 127.6 LBS

## 2025-03-31 DIAGNOSIS — Z30.433 ENCOUNTER FOR REMOVAL AND REINSERTION OF INTRAUTERINE CONTRACEPTIVE DEVICE: ICD-10-CM

## 2025-03-31 DIAGNOSIS — Z32.02 PREGNANCY TEST NEGATIVE: Primary | ICD-10-CM

## 2025-03-31 LAB — PREGNANCY TEST URINE, POC: NEGATIVE

## 2025-03-31 PROCEDURE — 58301 REMOVE INTRAUTERINE DEVICE: CPT | Performed by: OBSTETRICS & GYNECOLOGY

## 2025-03-31 PROCEDURE — 58300 INSERT INTRAUTERINE DEVICE: CPT | Performed by: OBSTETRICS & GYNECOLOGY

## 2025-03-31 PROCEDURE — 81025 URINE PREGNANCY TEST: CPT | Performed by: OBSTETRICS & GYNECOLOGY

## 2025-03-31 ASSESSMENT — ENCOUNTER SYMPTOMS
NEUROLOGICAL NEGATIVE: 0
HEMATOLOGIC/LYMPHATIC NEGATIVE: 0
EYES NEGATIVE: 0
CARDIOVASCULAR NEGATIVE: 0
ALLERGIC/IMMUNOLOGIC NEGATIVE: 0
ENDOCRINE NEGATIVE: 0
CONSTITUTIONAL NEGATIVE: 0
RESPIRATORY NEGATIVE: 0
GASTROINTESTINAL NEGATIVE: 0
MUSCULOSKELETAL NEGATIVE: 0
PSYCHIATRIC NEGATIVE: 0

## 2025-03-31 NOTE — PROGRESS NOTES
Patient ID: Idania Del Cid is a 45 y.o. female.    Remove IUD    Performed by: Mia Calderón DO  Authorized by: Mia Calderón DO    Procedure: IUD removal and insertion    Consent obtained by patient, parent, or legal power of  - including discussion of procedure risks and benefits, patient questions answered, and patient education provided: yes    Reason for removal: patient request    Strings visualized: yes    Cervix cleaned with: chlorhexidine    IUD grasped by forceps: yes    IUD removed: yes    Date/Time of Removal:  3/31/2025 11:38 AM  Removed without complications: yes    IUD intact: yes    Pregnancy risk: reasonably certain the patient is not pregnant    Date/Time of Insertion:  3/31/2025 11:38 AM  Speculum placed in vagina: yes    Cervix cleaned and prepped: yes    Tenaculum/Allis/Ring Forceps applied to cervix: yes    Anesthesia used: yes    Local anesthesia:  Paracervical  Local anesthetic:  Lidocaine  Anesthetic strength (%):  1  Volume (mL):  3  Uterus sound depth (cm):  7  IUD inserted without complications: yes    Strings trimmed to (cm):  3  Patient tolerated procedure well: yes    Inserted with ultrasound guidance: no    Transvaginal sono confirmed fundal placement: yes    Estimated blood loss (mL):  5  Intended removal date: 8 years    Insert IUD    Performed by: Mia Calderón DO  Authorized by: DO Mia Gallegos DO

## 2025-03-31 NOTE — PATIENT INSTRUCTIONS
IUD Insertion Visit:  You were seen in the office today for Mirena IUD insertion  An ultrasound was done in office today that confirmed correct positioning of your IUD in the uterine cavity  It is common to have vaginal bleeding and cramping following insertion. You can use Tylenol, Ibuprofen, or Naproxen to alleviate cramping. Bleeding can taper over several weeks.  If your IUD was placed during a menstrual period you can consider it immediately effective against pregnancy. If it was not placed during menstrual period it can take 2-3 weeks to be effective, and condoms should be used to protect against pregnancy during that time  IUD's protect against pregnancy but do not protect against STD's. Please remember to use condoms with any new partners and be checked for STD's if you are having vaginal symptoms or have had a new partner within the last year  You may choose to (but do not have to) check for your IUD strings by placing 1-2 clean finger into the vagina. Be careful not to pull/tug the strings as this can displace the IUD. Be careful when using menstrual cups or other similar products, or switch to pads/tampons, as vaginal collection devices can pull on the IUD strings during insertion/removal  If at any time you have symptoms of your IUD being displaced (new heavy bleeding, pelvic cramping, visualization/sensation of IUD strings at the vaginal opening), you should take a pregnancy test, call the office for an appointment, and use condoms to prevent pregnancy until another form of contraception is initiated. If your IUD falls out, rinse it off and bring it to the office in a plastic bag because the company that manufactures the device may be able to replace the device for free.  Make a follow up appointment in the office for 4-6 weeks. Continue to follow up annually for routine gynecologic exams  Call the office if you are having any problems with your IUD or if you have any questions regarding IUD use.  [phone] ([place]) or [phone] ([place])

## 2025-07-30 ENCOUNTER — APPOINTMENT (OUTPATIENT)
Dept: PRIMARY CARE | Facility: CLINIC | Age: 45
End: 2025-07-30
Payer: COMMERCIAL

## 2025-07-30 VITALS
HEIGHT: 66 IN | WEIGHT: 129 LBS | OXYGEN SATURATION: 98 % | SYSTOLIC BLOOD PRESSURE: 108 MMHG | BODY MASS INDEX: 20.73 KG/M2 | TEMPERATURE: 98 F | HEART RATE: 54 BPM | DIASTOLIC BLOOD PRESSURE: 70 MMHG | RESPIRATION RATE: 20 BRPM

## 2025-07-30 DIAGNOSIS — Z00.00 ANNUAL PHYSICAL EXAM: Primary | ICD-10-CM

## 2025-07-30 DIAGNOSIS — R00.2 PALPITATIONS: ICD-10-CM

## 2025-07-30 PROCEDURE — 99396 PREV VISIT EST AGE 40-64: CPT | Performed by: NURSE PRACTITIONER

## 2025-07-30 PROCEDURE — 93000 ELECTROCARDIOGRAM COMPLETE: CPT | Performed by: NURSE PRACTITIONER

## 2025-07-30 PROCEDURE — 3008F BODY MASS INDEX DOCD: CPT | Performed by: NURSE PRACTITIONER

## 2025-07-30 PROCEDURE — 99214 OFFICE O/P EST MOD 30 MIN: CPT | Performed by: NURSE PRACTITIONER

## 2025-07-30 ASSESSMENT — ANXIETY QUESTIONNAIRES
IF YOU CHECKED OFF ANY PROBLEMS ON THIS QUESTIONNAIRE, HOW DIFFICULT HAVE THESE PROBLEMS MADE IT FOR YOU TO DO YOUR WORK, TAKE CARE OF THINGS AT HOME, OR GET ALONG WITH OTHER PEOPLE: NOT DIFFICULT AT ALL
4. TROUBLE RELAXING: NOT AT ALL
3. WORRYING TOO MUCH ABOUT DIFFERENT THINGS: NOT AT ALL
GAD7 TOTAL SCORE: 1
5. BEING SO RESTLESS THAT IT IS HARD TO SIT STILL: NOT AT ALL
6. BECOMING EASILY ANNOYED OR IRRITABLE: SEVERAL DAYS
1. FEELING NERVOUS, ANXIOUS, OR ON EDGE: NOT AT ALL
7. FEELING AFRAID AS IF SOMETHING AWFUL MIGHT HAPPEN: NOT AT ALL
2. NOT BEING ABLE TO STOP OR CONTROL WORRYING: NOT AT ALL

## 2025-07-30 ASSESSMENT — PATIENT HEALTH QUESTIONNAIRE - PHQ9
7. TROUBLE CONCENTRATING ON THINGS, SUCH AS READING THE NEWSPAPER OR WATCHING TELEVISION: NOT AT ALL
1. LITTLE INTEREST OR PLEASURE IN DOING THINGS: NOT AT ALL
SUM OF ALL RESPONSES TO PHQ9 QUESTIONS 1 AND 2: 0
3. TROUBLE FALLING OR STAYING ASLEEP OR SLEEPING TOO MUCH: SEVERAL DAYS
6. FEELING BAD ABOUT YOURSELF - OR THAT YOU ARE A FAILURE OR HAVE LET YOURSELF OR YOUR FAMILY DOWN: NOT AT ALL
5. POOR APPETITE OR OVEREATING: MORE THAN HALF THE DAYS
8. MOVING OR SPEAKING SO SLOWLY THAT OTHER PEOPLE COULD HAVE NOTICED. OR THE OPPOSITE, BEING SO FIGETY OR RESTLESS THAT YOU HAVE BEEN MOVING AROUND A LOT MORE THAN USUAL: NOT AT ALL
2. FEELING DOWN, DEPRESSED OR HOPELESS: NOT AT ALL
9. THOUGHTS THAT YOU WOULD BE BETTER OFF DEAD, OR OF HURTING YOURSELF: NOT AT ALL
4. FEELING TIRED OR HAVING LITTLE ENERGY: MORE THAN HALF THE DAYS
SUM OF ALL RESPONSES TO PHQ QUESTIONS 1-9: 5

## 2025-07-30 NOTE — PROGRESS NOTES
Subjective   Patient ID: Idania Del Cid is a 45 y.o. female who presents for Establish Care (Fainted 2 smashed her head).    HPI    Concern of warm electrical shock/zapping sensation that arises from the upper abdomen to chest and radiates to arms since syncopal episode x 2 Feb 11, 2025.  These have been occurring 2 to 3/h and now has reduced to 2-3 a day.  Just left pediatrician with her 8-year-old son and had loss of consciousness but does not know how or how long.  Fell striking the right occipital/parietal side of the head.  Experienced immediate hair loss to that area.  EMS took her to [facility] and found her to have hypokalemia and dehydration.  CT of the head with right parietal scalp hematoma.  There was no acute intracranial hemorrhage or mass effect.  She was rehydrated and sent home.  Since then has not had any syncopal events or loss of consciousness.  Hair regrew back to the right parietal side of the head.  No seizure history.    Continues to exercise by walking twice daily.  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  Is without shortness of breath, chest pain, palpitations, syncopal episodes, loss of consciousness.  Exercise walking twice daily.    Does have sleep disturbances secondary to hot flashes that interrupt sleep.  Last menstrual period July 19, 2025.  Light menses lasted 5 days.  IUD implant status 3/2025.    History of kidney stones and lithotripsy x 2.  Last stone passed 2024.  Follows Dr. Lozano, urologist at [facility].  Is without flank pain, suprapubic pain, blood in urine, dysuria.    Half sister diagnosed with colon cancer at age 32.  Idania's first colonoscopy 2022 next colonoscopy due 2027.  Denies any blood in stool or change caliber of stool.  Regular BMs every day to every other day.  No unintentional weight loss.    Medication Documentation Review Audit       Reviewed by Mia Hutton MA (Medical Assistant) on 07/30/25  "at 0859      Medication Order Taking? Sig Documenting Provider Last Dose Status   levonorgestrel (Mirena) 21 mcg/24 hours (8 yrs) 52 mg IUD [id number]  by intrauterine route. Historical Provider, MD  Active   valACYclovir (Valtrex) 1 gram tablet [id number]  Take 1 tablet (1,000 mg) by mouth every 12 hours. Historical Provider, MD  Active                     Vitals:    07/30/25 0852   BP: 108/70   Pulse: 54   Resp: 20   Temp: 36.7 °C (98 °F)   TempSrc: Temporal   SpO2: 98%   Weight: 58.5 kg (129 lb)   Height: 1.676 m (5' 6\")      Body mass index is 20.82 kg/m².     Review of Systems   All other systems reviewed and are negative.  And what is in the history of present illness.    Objective   Physical Exam  Vitals and nursing note reviewed.   Constitutional:       Appearance: Normal appearance.   HENT:      Head: Normocephalic.      Right Ear: Tympanic membrane, ear canal and external ear normal.      Left Ear: Tympanic membrane, ear canal and external ear normal.      Nose: Nose normal.      Mouth/Throat:      Mouth: Mucous membranes are moist.     Eyes:      Extraocular Movements: Extraocular movements intact.      Conjunctiva/sclera: Conjunctivae normal.      Pupils: Pupils are equal, round, and reactive to light.       Cardiovascular:      Rate and Rhythm: Normal rate and regular rhythm.      Pulses: Normal pulses.      Heart sounds: Normal heart sounds.   Pulmonary:      Effort: Pulmonary effort is normal. No respiratory distress.      Breath sounds: Normal breath sounds.   Abdominal:      General: Abdomen is flat. Bowel sounds are normal.      Palpations: Abdomen is soft.     Musculoskeletal:         General: Normal range of motion.      Cervical back: Normal range of motion and neck supple.      Right lower leg: No edema.      Left lower leg: No edema.     Skin:     General: Skin is warm and dry.      Capillary Refill: Capillary refill takes less than 2 seconds.     Neurological:      Mental Status: She is alert and " oriented to person, place, and time.      Cranial Nerves: No cranial nerve deficit.      Sensory: No sensory deficit.      Motor: No weakness.      Gait: Gait normal.     Psychiatric:         Mood and Affect: Mood normal.         Behavior: Behavior normal.              Assessment/Plan   Assessment & Plan  Annual physical exam  GAPS IN CARE  CPE:overall doing well.   Discussed diet/exercises 2 days out of the week.  Very active at work walking between 16,000 and 20,000 steps per shift several days per week.  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  BMI: 20  VACC: reviewed.  Tdap 2017.  Due 2027.  Declines flu, COVID vaccines for right now.  COLON CA SCRN: 2022.  Due 2027.  Half sister with colon cancer.  LABS: As below  PAP: 2022.  Due 2027.  MAMMO: Ordered by OB/GYN March 2025.  DEXA: Age 67.  NEDRA 7: 1  PHQ 9: 5: Usually due to sleep troubles.  RTC annually and as needed.  Orders:    Lipid Panel; Future    Palpitations    Orders:    CBC and Auto Differential; Future    Comprehensive Metabolic Panel; Future    Hemoglobin A1C; Future    TSH with reflex to Free T4 if abnormal; Future    ECG 12 Lead today resulted sinus bradycardia at 50 bpm otherwise normal ECG    Holter Monitor >48 Hours - 7 Days - No Charges; Future    Transthoracic Echo Complete; Future    XR chest 2 views; Future  Consider physical stress test pending on Holter monitor, echo results.           APPLE Mccabe-CNP 07/30/25 9:27 AM

## 2025-08-22 ENCOUNTER — APPOINTMENT (OUTPATIENT)
Dept: PRIMARY CARE | Facility: CLINIC | Age: 45
End: 2025-08-22
Payer: COMMERCIAL

## 2025-09-03 ENCOUNTER — HOSPITAL ENCOUNTER (OUTPATIENT)
Dept: RADIOLOGY | Facility: HOSPITAL | Age: 45
Discharge: HOME | End: 2025-09-03
Payer: COMMERCIAL

## 2025-09-03 ENCOUNTER — HOSPITAL ENCOUNTER (OUTPATIENT)
Dept: CARDIOLOGY | Facility: HOSPITAL | Age: 45
Discharge: HOME | End: 2025-09-03
Payer: COMMERCIAL

## 2025-09-03 DIAGNOSIS — R00.2 PALPITATIONS: ICD-10-CM

## 2025-09-03 DIAGNOSIS — R55 SYNCOPE AND COLLAPSE: ICD-10-CM

## 2025-09-03 PROCEDURE — 93306 TTE W/DOPPLER COMPLETE: CPT | Performed by: STUDENT IN AN ORGANIZED HEALTH CARE EDUCATION/TRAINING PROGRAM

## 2025-09-03 PROCEDURE — 71046 X-RAY EXAM CHEST 2 VIEWS: CPT | Performed by: RADIOLOGY

## 2025-09-03 PROCEDURE — 93306 TTE W/DOPPLER COMPLETE: CPT

## 2025-09-03 PROCEDURE — 71046 X-RAY EXAM CHEST 2 VIEWS: CPT

## 2025-09-04 LAB
AORTIC VALVE MEAN GRADIENT: 3 MMHG
AORTIC VALVE PEAK VELOCITY: 1.18 M/S
AV PEAK GRADIENT: 6 MMHG
AVA (PEAK VEL): 1.6 CM2
AVA (VTI): 1.61 CM2
EJECTION FRACTION APICAL 4 CHAMBER: 54.7
EJECTION FRACTION: 60 %
LEFT ATRIUM VOLUME AREA LENGTH INDEX BSA: 19.7 ML/M2
LEFT VENTRICLE INTERNAL DIMENSION DIASTOLE: 4.57 CM (ref 3.5–6)
LEFT VENTRICULAR OUTFLOW TRACT DIAMETER: 1.7 CM
MITRAL VALVE E/A RATIO: 1.07
RIGHT VENTRICLE FREE WALL PEAK S': 9.25 CM/S
TRICUSPID ANNULAR PLANE SYSTOLIC EXCURSION: 1.5 CM